# Patient Record
Sex: FEMALE | Race: WHITE
[De-identification: names, ages, dates, MRNs, and addresses within clinical notes are randomized per-mention and may not be internally consistent; named-entity substitution may affect disease eponyms.]

---

## 2020-06-05 ENCOUNTER — HOSPITAL ENCOUNTER (OUTPATIENT)
Dept: HOSPITAL 88 - DX | Age: 75
End: 2020-06-05
Attending: INTERNAL MEDICINE
Payer: MEDICARE

## 2020-06-05 DIAGNOSIS — R07.89: Primary | ICD-10-CM

## 2020-06-05 DIAGNOSIS — Z11.59: ICD-10-CM

## 2020-06-05 PROCEDURE — 74230 X-RAY XM SWLNG FUNCJ C+: CPT

## 2020-06-05 PROCEDURE — 87635 SARS-COV-2 COVID-19 AMP PRB: CPT

## 2020-06-05 NOTE — DIAGNOSTIC IMAGING REPORT
Modified Barium Swallow: 

Fluoroscopy service was provided to speech pathology department for this exam.

A full report from speech pathology will follow.



Impression:

As above.



Fluoroscopy time 1.4 minutes. Fluoroscopy dose: 7.34 mGy air kerma. 



Signed by: Grupo Covarrubias MD on 6/5/2020 12:14 PM

## 2021-04-08 ENCOUNTER — HOSPITAL ENCOUNTER (OUTPATIENT)
Dept: HOSPITAL 88 - US | Age: 76
End: 2021-04-08
Attending: FAMILY MEDICINE
Payer: MEDICARE

## 2021-04-08 DIAGNOSIS — N39.0: Primary | ICD-10-CM

## 2021-04-08 PROCEDURE — 76857 US EXAM PELVIC LIMITED: CPT
